# Patient Record
Sex: FEMALE | Race: WHITE
[De-identification: names, ages, dates, MRNs, and addresses within clinical notes are randomized per-mention and may not be internally consistent; named-entity substitution may affect disease eponyms.]

---

## 2019-08-11 ENCOUNTER — HOSPITAL ENCOUNTER (INPATIENT)
Dept: HOSPITAL 74 - JER | Age: 84
LOS: 3 days | Discharge: HOME | DRG: 552 | End: 2019-08-14
Attending: FAMILY MEDICINE | Admitting: FAMILY MEDICINE
Payer: COMMERCIAL

## 2019-08-11 VITALS — BODY MASS INDEX: 32.3 KG/M2

## 2019-08-11 DIAGNOSIS — Z88.0: ICD-10-CM

## 2019-08-11 DIAGNOSIS — K57.30: ICD-10-CM

## 2019-08-11 DIAGNOSIS — I50.42: ICD-10-CM

## 2019-08-11 DIAGNOSIS — E66.9: ICD-10-CM

## 2019-08-11 DIAGNOSIS — M19.90: ICD-10-CM

## 2019-08-11 DIAGNOSIS — K21.9: ICD-10-CM

## 2019-08-11 DIAGNOSIS — M54.9: Primary | ICD-10-CM

## 2019-08-11 DIAGNOSIS — I11.0: ICD-10-CM

## 2019-08-11 DIAGNOSIS — L84: ICD-10-CM

## 2019-08-11 DIAGNOSIS — M79.89: ICD-10-CM

## 2019-08-11 DIAGNOSIS — K29.70: ICD-10-CM

## 2019-08-11 DIAGNOSIS — D50.9: ICD-10-CM

## 2019-08-11 DIAGNOSIS — E78.5: ICD-10-CM

## 2019-08-11 DIAGNOSIS — D64.9: ICD-10-CM

## 2019-08-11 LAB
ALBUMIN SERPL-MCNC: 4 G/DL (ref 3.4–5)
ALP SERPL-CCNC: 77 U/L (ref 45–117)
ALT SERPL-CCNC: 16 U/L (ref 13–61)
ANION GAP SERPL CALC-SCNC: 5 MMOL/L (ref 8–16)
APPEARANCE UR: CLEAR
APTT BLD: 31.2 SECONDS (ref 25.2–36.5)
AST SERPL-CCNC: 19 U/L (ref 15–37)
BACTERIA # UR AUTO: 24.2 /HPF
BASOPHILS # BLD: 1.2 % (ref 0–2)
BILIRUB SERPL-MCNC: 0.3 MG/DL (ref 0.2–1)
BILIRUB UR STRIP.AUTO-MCNC: NEGATIVE MG/DL
BNP SERPL-MCNC: 655.9 PG/ML (ref 5–450)
BUN SERPL-MCNC: 18.3 MG/DL (ref 7–18)
CALCIUM SERPL-MCNC: 9.8 MG/DL (ref 8.5–10.1)
CASTS URNS QL MICRO: 0 /LPF (ref 0–8)
CHLORIDE SERPL-SCNC: 105 MMOL/L (ref 98–107)
CO2 SERPL-SCNC: 29 MMOL/L (ref 21–32)
COLOR UR: YELLOW
CREAT SERPL-MCNC: 0.9 MG/DL (ref 0.55–1.3)
DEPRECATED RDW RBC AUTO: 15.3 % (ref 11.6–15.6)
EOSINOPHIL # BLD: 1.3 % (ref 0–4.5)
EPITH CASTS URNS QL MICRO: 2.8 /HPF
GLUCOSE SERPL-MCNC: 101 MG/DL (ref 74–106)
HCT VFR BLD CALC: 28.5 % (ref 32.4–45.2)
HGB BLD-MCNC: 9.1 GM/DL (ref 10.7–15.3)
INR BLD: 0.98 (ref 0.83–1.09)
KETONES UR QL STRIP: NEGATIVE
LEUKOCYTE ESTERASE UR QL STRIP.AUTO: (no result)
LIPASE SERPL-CCNC: 93 U/L (ref 73–393)
LYMPHOCYTES # BLD: 35.2 % (ref 8–40)
MCH RBC QN AUTO: 26.8 PG (ref 25.7–33.7)
MCHC RBC AUTO-ENTMCNC: 32.1 G/DL (ref 32–36)
MCV RBC: 83.6 FL (ref 80–96)
MONOCYTES # BLD AUTO: 8.4 % (ref 3.8–10.2)
NEUTROPHILS # BLD: 53.9 % (ref 42.8–82.8)
NITRITE UR QL STRIP: NEGATIVE
PH UR: 6.5 [PH] (ref 5–8)
PLATELET # BLD AUTO: 182 K/MM3 (ref 134–434)
PMV BLD: 9.3 FL (ref 7.5–11.1)
POTASSIUM SERPLBLD-SCNC: 4.2 MMOL/L (ref 3.5–5.1)
PROT SERPL-MCNC: 8.1 G/DL (ref 6.4–8.2)
PROT UR QL STRIP: NEGATIVE
PROT UR QL STRIP: NEGATIVE
PT PNL PPP: 11.6 SEC (ref 9.7–13)
RBC # BLD AUTO: 1 /HPF (ref 0–4)
RBC # BLD AUTO: 3.41 M/MM3 (ref 3.6–5.2)
SODIUM SERPL-SCNC: 139 MMOL/L (ref 136–145)
SP GR UR: 1.01 (ref 1.01–1.03)
UROBILINOGEN UR STRIP-MCNC: 0.2 MG/DL (ref 0.2–1)
WBC # BLD AUTO: 6.2 K/MM3 (ref 4–10)
WBC # UR AUTO: 3 /HPF (ref 0–5)

## 2019-08-11 PROCEDURE — G0378 HOSPITAL OBSERVATION PER HR: HCPCS

## 2019-08-11 NOTE — PDOC
Documentation entered by Skyla Gallego SCRIBE, acting as scribe for Liang Pugh MD.








Liang Pugh MD:  This documentation has been prepared by the Julián delgado Sammi, SCRIBE, under my direction and personally reviewed by me in its 

entirety.  I confirm that the documentation accurately reflects all work, 

treatment, procedures, and medical decision making performed by me.  





Attending Attestation





- Resident


Resident Name: Woody Matamoros





- ED Attending Attestation


I have performed the following: I have examined & evaluated the patient, The 

case was reviewed & discussed with the resident, I agree w/resident's findings 

& plan, Exceptions are as noted





- HPI


HPI: 





08/11/19 22:58


86 F with h/o HTN, HLD, arthritis, presenting with sudden onset chest pain 

radiating to back. Pt states she was at rest when pain began. Denies exertional 

or pleuritic component to it. Pt also endorses BLE swelling. Denies h/o DVT/PE. 

Denies F/C/cough.





- Physicial Exam


PE: 





08/11/19 22:58


"GENERAL: Awake, alert, and fully oriented, in no acute distress.


HEAD: No signs of trauma


EYES: PERRLA, EOMI, sclera anicteric, conjunctiva clear


ENT: Auricles normal inspection, hearing grossly normal, nares patent, 

oropharynx clear without exudates. Moist mucosa


NECK: Nontender, no stepoffs, Normal ROM, supple, no lymphadenopathy, JVD, or 

masses


LUNGS: Breath sounds equal, clear to auscultation bilaterally.  No wheezes, and 

no crackles


HEART: Regular rate and rhythm, normal S1 and S2, no murmurs, rubs or gallops


ABDOMEN: Soft, nontender, normoactive bowel sounds.  No guarding, no rebound.  

No masses


EXTREMITIES: + BLE edema, R>L, No clubbing or cyanosis. No cords, erythema, or 

tenderness


NEUROLOGICAL: Cranial nerves II through XII intact. 5/5 strength and sensation 

in all extremities, Normal speech, normal gait, normal cerebellar function


SKIN: Warm, Dry, normal turgor, no rashes or lesions noted.








- Critical Care Time


Total Critical Care Time: 60


Critical Care Statement: The care of this patient involved high complexity 

decision making to prevent further life threatening deterioration of the patient

's condition and/or to evaluate & treat vital organ system(s) failure or risk 

of failure.





- Medical Decision Making





08/11/19 22:59


86 F with chest pain radiating to back. Found to be HTNsive in ED. Will r/o 

dissection. Pt also with asymmetric leg swelling, will need to evaluate for DVT/

PE. EKG with no ischemic changes to suggest ACS.


- Labs, trop, BNP


- CTA chest


- RLE Doppler


- BP control


- Admit tele

## 2019-08-11 NOTE — PDOC
History of Present Illness





- General


Chief Complaint: Chest Pain


Stated Complaint: CHEST PAIN


Time Seen by Provider: 08/11/19 19:38





- History of Present Illness


Initial Comments: 





08/11/19 21:49


86f with htn, arthritis and hld presenting with severe chest pain with 

radiation to the back between the shoulder blades that came suddenly while she 

was at home. 


Occasionally the pain makes it harder to breathe. 


Had similar symptoms in the past, had a negative cardiac workup: 


  - deconditioned stress MIBI 11/09: poor exercise tolerance--walked [id number] 

on [place]; no ischemia or injury)            


Stress Persantine MIBI 01/2012 (at [facility]): no myocardial ischemia; normal LVEF 

and wall motion by gated studies.





She denies  fever chills abdominal pain or dysuria. 


Denies active cancer, recent travel or hemoptysis. 


Did notice swelling of the right leg worse than the left, unsure when that 

appeared. 


08/11/19 22:44








Past History





- Past Medical History


Allergies/Adverse Reactions: 


 Allergies











Allergy/AdvReac Type Severity Reaction Status Date / Time


 


Penicillins Allergy Severe Difficulty Verified 10/22/16 00:52





   Breathing  











Home Medications: 


Ambulatory Orders





Amlodipine Besylate [Norvasc -] 2.5 mg PO DAILY 10/22/16 


Lansoprazole [Prevacid] 30 mg PO DAILY 10/22/16 


Lisinopril [Prinivil -] 40 mg PO DAILY 10/22/16 


Metoprolol Succinate [Toprol Xl] 50 mg PO DAILY 10/22/16 


Tramadol HCl 50 mg PO BID 10/22/16 








Cardiac Disorders: Yes


CHF: Yes (systolic and diastolic dysfuction)


GI Disorders: Yes (GASTRITIS. HERNIA.)


HTN: Yes





- Family Disease History


Family Disease History: Heart Disease: Mother (hypertension)





- Immunization History


Immunization Up to Date: Yes





- Suicide/Smoking/Psychosocial Hx


Smoking Status: No


Smoking History: Never smoked


Have you smoked in the past 12 months: No


Number of Cigarettes Smoked Daily: 0


Hx Alcohol Use: No


Drug/Substance Use Hx: No


Substance Use Type: None


Hx Substance Use Treatment: No





**Review of Systems





- Review of Systems


Able to Perform ROS?: Yes


Is the patient limited English proficient: No


Constitutional: No: Symptoms Reported


HEENTM: No: Symptoms Reported


Respiratory: Yes: See HPI


Cardiac (ROS): Yes: See HPI


ABD/GI: No: Symptoms Reported


: No: Symptoms Reported


Integumentary: No: Symptoms Reported


Neurological: No: Symptoms reported


All Other Systems: Reviewed and Negative





*Physical Exam





- Vital Signs


 Last Vital Signs











Temp Pulse Resp BP Pulse Ox


 


 98.7 F   59 L  19   182/69 H  98 


 


 08/11/19 19:05  08/11/19 19:05  08/11/19 19:05  08/11/19 19:05  08/11/19 19:05














- Physical Exam


General Appearance: Yes: Appropriately Dressed, Mild Distress, Obese


HEENT: positive: EOMI, ARIANNE, Normal ENT Inspection


Respiratory/Chest: positive: Lungs Clear, Normal Breath Sounds.  negative: 

Chest Tender, Respiratory Distress


Cardiovascular: positive: Regular Rhythm, S1, S2, Bradycardia


Gastrointestinal/Abdominal: positive: Normal Bowel Sounds, Soft, Protuberent.  

negative: Tender


Extremity: positive: Pedal Edema (Worse swelling over right leg, tender 

throughout the leg)


Integumentary: positive: Normal Color, Dry, Warm


Neurologic: positive: Fully Oriented, Alert, Normal Mood/Affect, Normal Response

, Motor Strength 5/5





**Heart Score/ECG Review





- History


History: Moderately suspicious





- Electrocardiogram


EKG: Normal





- Age


Age: >/= 65





- Risk Factors


Risk Factors Heart Score: Yes Hx Hypertension, Yes Positive family hx of 

cardiac disease, Yes Hx Obesity


Based on the list above the patient has:: >/=3 risk factors or Hx 

atherosclerotic disease





- Troponin


Troponin: </= normal limit





- Score


Heart Score - Total: 5





ED Treatment Course





- LABORATORY


CBC & Chemistry Diagram: 


 08/11/19 20:35





 08/11/19 20:35





Medical Decision Making





- Medical Decision Making





08/11/19 22:43


86F with severe tearing chest pain radiating to back, sob on/off and right leg 

swelling. 








Will obtain immediate CTA chest and abd/pelvis to r/o dissection.


In addition, suspicion for DVT/PE that will be excluded qwith the CTA chest and 

will get Duplex U/S of the right leg. 





Will obtain labs and cardiac trops as well to r/o MI. 





EKG: sinus bradycardia with marked sinus arrhytmia. Left axis deviation. Cannot 

r/o anterior infarct, age undertermines. Vent rate 59, , QRS 84  QT/QTC 

422/417








CTA read: 1. Study negative for thoracic/abdominal aortic dissection or 

aneurysm. No abnormalities of the


pelvic arterial vessels identified either.


2. No CT evidence for pulmonary embolism.


3. Colonic diverticulosis. No CT evidence of diverticulitis.


4. Degenerative disc disease and degenerative changes throughout the thoracic 

and lumbar spine








All labs wnl, except bnp elevated at 660





Will control htn with labetalol


1. Study negative for thoracic/abdominal aortic dissection or aneurysm. No 

abnormalities of the


pelvic arterial vessels identified either.


2. No CT evidence for pulmonary embolism.


3. Colonic diverticulosis. No CT evidence of diverticulitis.


4. Degenerative disc disease and degenerative changes throughout the thoracic 

and lumbar spine


08/11/19 22:52





DVT study Negative for DVT


08/11/19 23:57


Will control BP with labetalol 


Heart score of 5. Will admit to tele obs





*DC/Admit/Observation/Transfer


Diagnosis at time of Disposition: 


 Chest pain, CHF (congestive heart failure), Right leg swelling








- Discharge Dispostion


Decision to Admit order: Yes





- Referrals





- Patient Instructions





- Post Discharge Activity

## 2019-08-12 RX ADMIN — FUROSEMIDE SCH MG: 10 INJECTION, SOLUTION INTRAVENOUS at 15:16

## 2019-08-12 RX ADMIN — LISINOPRIL SCH MG: 20 TABLET ORAL at 11:02

## 2019-08-12 RX ADMIN — HEPARIN SODIUM SCH UNIT: 5000 INJECTION, SOLUTION INTRAVENOUS; SUBCUTANEOUS at 22:19

## 2019-08-12 RX ADMIN — HEPARIN SODIUM SCH UNIT: 5000 INJECTION, SOLUTION INTRAVENOUS; SUBCUTANEOUS at 11:00

## 2019-08-12 RX ADMIN — PANTOPRAZOLE SODIUM SCH MG: 40 TABLET, DELAYED RELEASE ORAL at 11:02

## 2019-08-12 RX ADMIN — AMLODIPINE BESYLATE SCH MG: 2.5 TABLET ORAL at 11:02

## 2019-08-12 NOTE — CON.CARD
Consult


Consult Specialty:: Cardiology





- History of Present Illness


History of Present Illness: 





86 year old female with PMHx of HTN/HLD, Arthritis, arrived to ED for complain 

of upper back pain (around right shoulder blade) with occasional sob, pain 

present for 2 days as per patient took Tylenol with relief,  also complains of B

/L LE swelling. Denies headache dizziness, fever chills abdominal pain or 

dysuria. 





- History Source


History Provided By: Patient, Medical Record





- Past Medical History


Cardio/Vascular: Yes: HTN





- Alcohol/Substance Use


Hx Alcohol Use: No





- Smoking History


Smoking history: Never smoked


Have you smoked in the past 12 months: No


Aproximately how many cigarettes per day: 0





Home Medications





- Allergies


Allergies/Adverse Reactions: 


 Allergies











Allergy/AdvReac Type Severity Reaction Status Date / Time


 


Penicillins Allergy Severe Difficulty Verified 10/22/16 00:52





   Breathing  














- Home Medications


Home Medications: 


Ambulatory Orders





Amlodipine Besylate [Norvasc -] 2.5 mg PO DAILY 10/22/16 


Lansoprazole [Prevacid] 30 mg PO DAILY 10/22/16 


Lisinopril [Prinivil -] 40 mg PO DAILY 10/22/16 


Metoprolol Succinate [Toprol Xl] 50 mg PO DAILY 10/22/16 


Tramadol HCl 50 mg PO BID 10/22/16 











Review of Systems





- Review of Systems


Constitutional: reports: No Symptoms


Eyes: reports: No Symptoms


HENT: reports: No Symptoms


Neck: reports: No Symptoms


Cardiovascular: reports: Edema, Shortness of Breath


Respiratory: reports: SOB


Gastrointestinal: reports: No Symptoms


Genitourinary: reports: No Symptoms


Breasts: reports: No Symptoms Reported


Musculoskeletal: reports: No Symptoms


Integumentary: reports: No Symptoms


Neurological: reports: No Symptoms


Endocrine: reports: No Symptoms


Hematology/Lymphatic: reports: No Symptoms


Psychiatric: reports: No Symptoms


Vital Signs: 


 Vital Signs











Temperature  98 F   08/12/19 09:00


 


Pulse Rate  60   08/12/19 09:00


 


Respiratory Rate  18   08/12/19 09:00


 


Blood Pressure  162/69   08/12/19 09:00


 


O2 Sat by Pulse Oximetry (%)  100   08/12/19 09:00











Constitutional: Yes: Well Nourished, No Distress, Calm


Eyes: Yes: WNL, Conjunctiva Clear, EOM Intact


HENT: Yes: WNL, Atraumatic, Normocephalic


Neck: Yes: WNL, Supple, Trachea Midline


Respiratory: Yes: WNL, Regular, CTA Bilaterally


Gastrointestinal: Yes: WNL, Normal Bowel Sounds


Renal/: Yes: WNL


Cardiovascular: Yes: WNL, Regular Rate and Rhythm


Musculoskeletal: Yes: WNL


Extremities: Yes: WNL


Edema: Yes


Edema: LLE: Trace, RLE: Trace


Integumentary: Yes: WNL


Neurological: Yes: WNL, Alert, Oriented


...Motor Strength: WNL


Psychiatric: Yes: WNL, Alert, Oriented





- Other Data


Labs, Other Data: 


 CBC, BMP





 08/11/19 20:35 





 08/11/19 20:35 





 INR, PTT











INR  0.98  (0.83-1.09)   08/11/19  20:35    








 Troponin, BNP











  08/11/19 08/11/19





  20:35 20:35


 


Troponin I  < 0.02 


 


B-Natriuretic Peptide   655.9 H








 Troponin, BNP











  08/11/19 08/11/19





  20:35 20:35


 


Troponin I  < 0.02 


 


B-Natriuretic Peptide   655.9 H














Imaging





- Results


Chest X-ray: Pending


EKG: Image Reviewed (s beverly s arhythmia)





Problem List





- Problems


(1) Arthritis


Code(s): M19.90 - UNSPECIFIED OSTEOARTHRITIS, UNSPECIFIED SITE   





(2) CHF (congestive heart failure)


Code(s): I50.9 - HEART FAILURE, UNSPECIFIED   





(3) Chest pain


Code(s): R07.9 - CHEST PAIN, UNSPECIFIED   





(4) GERD (gastroesophageal reflux disease)


Code(s): K21.9 - GASTRO-ESOPHAGEAL REFLUX DISEASE WITHOUT ESOPHAGITIS   





(5) HLD (hyperlipidemia)


Code(s): E78.5 - HYPERLIPIDEMIA, UNSPECIFIED   





(6) Right leg swelling


Code(s): M79.89 - OTHER SPECIFIED SOFT TISSUE DISORDERS   





(7) DVT prophylaxis


Code(s): DFL9982 -    





(8) Gastritis


Code(s): K29.70 - GASTRITIS, UNSPECIFIED, WITHOUT BLEEDING   





(9) Callus of foot


Code(s): L84 - CORNS AND CALLOSITIES   





(10) HTN (hypertension), benign


Code(s): I10 - ESSENTIAL (PRIMARY) HYPERTENSION   





Assessment/Plan





HTN/HLD, Arthritis, arrived to ED for complain of upper back pain (around right 

shoulder blade) with occasional sob,


chf


anemia


cta neg for pe dissection





Plan


r/o mi


echo


iv lasix


stress test if not done recently in the office

## 2019-08-12 NOTE — HP
CHIEF COMPLAINT: Upper back pain with sob 





PCP: Dr. Vargas 





HISTORY OF PRESENT ILLNESS: 86 year old female with PMHx of HTN/HLD, Arthritis, 

arrived to ED for complain of upper back pain (around right shoulder blade) 

with occasional sob, pain present for 2 days as per patient took Tylenol with 

relief,  also complains of B/L LE swelling. Denies headache dizziness, fever 

chills abdominal pain or dysuria. 





ER course was notable for:


- deconditioned stress MIBI 11/09: poor exercise tolerance--walked [id number] on 

[place]; no ischemia or injury)            


- Stress Persantine MIBI 01/2012 (at [facility]): no myocardial ischemia; normal LVEF 

and wall motion by gated studies.


- trops, EKG negative, CTA and vascular US negative 





Recent Travel: No





PAST MEDICAL HISTORY: HTN/HLD, Arthritis





PAST SURGICAL HISTORY: denies 





Social History:


Smoking:No


Alcohol: No


Drugs: No





Family History: Mother: had HTN , Daughter:  has HTN 


Allergies: Penicillins Allergy (Severe, Verified 10/22/16 00:52)


 Difficulty Breathing








HOME MEDICATIONS:


 Home Medications











 Medication  Instructions  Recorded


 


Amlodipine Besylate [Norvasc -] 2.5 mg PO DAILY 10/22/16


 


Lansoprazole [Prevacid] 30 mg PO DAILY 10/22/16


 


Lisinopril [Prinivil -] 40 mg PO DAILY 10/22/16


 


Metoprolol Succinate [Toprol Xl] 50 mg PO DAILY 10/22/16


 


Tramadol HCl 50 mg PO BID 10/22/16








REVIEW OF SYSTEMS


CONSTITUTIONAL: Absent:  fever, chills, diaphoresis, generalized weakness, 

malaise, loss of appetite, weight change


HEENT: Absent:  rhinorrhea, nasal congestion, throat pain, throat swelling, 

difficulty swallowing, mouth swelling, ear pain, eye pain, visual changes


CARDIOVASCULAR: Absent: chest pain, syncope, palpitations, irregular heart rate

, lightheadedness, peripheral edema


RESPIRATORY: + shortness of breath;  Absent: cough, dyspnea with exertion, 

orthopnea, wheezing, stridor, hemoptysis


GASTROINTESTINAL: Absent: abdominal pain, abdominal distension, nausea, vomiting

, diarrhea, constipation, melena, hematochezia


GENITOURINARY:  Absent: dysuria, frequency, urgency, hesitancy, hematuria, 

flank pain, genital pain


MUSCULOSKELETAL: + upper back back between right shoulder blade 


SKIN: Absent: rash, itching, pallor


NEUROLOGIC: Absent: headache, focal weakness or paresthesias, dizziness, 

unsteady gait, seizure, mental status changes, bladder or bowel incontinence


PSYCHIATRIC: Absent: anxiety, depression, suicidal or homicidal ideation, 

hallucinations.








PHYSICAL EXAMINATION


 Vital Signs - 24 hr











  08/11/19 08/11/19 08/11/19





  19:05 22:00 22:30


 


Temperature 98.7 F  


 


Pulse Rate 59 L  


 


Pulse Rate [  72 





Left Radial]   


 


Respiratory 19 20 





Rate   


 


Blood Pressure 182/69 H  


 


Blood Pressure  198/95 H 





[Left Arm]   


 


Blood Pressure  180/70 H 





[Right Arm]   


 


O2 Sat by Pulse 98 96 100





Oximetry (%)   














  08/12/19





  00:48


 


Temperature 


 


Pulse Rate 


 


Pulse Rate [ 60





Left Radial] 


 


Respiratory 16





Rate 


 


Blood Pressure 


 


Blood Pressure 172/77 H





[Left Arm] 


 


Blood Pressure 197/71 H





[Right Arm] 


 


O2 Sat by Pulse 97





Oximetry (%) 











GENERAL: Awake, alert, and fully oriented, in no acute distress.


HEENT: NC/AT, EOMI, PERRLA, No JVD


LUNGS: Breath sounds equal, clear to auscultation bilaterally. No wheezes, and 

no crackles.


HEART: Regular rate and rhythm, normal S1 and S2 without murmur, rub or gallop.


ABDOMEN: Soft, nontender, not distended, normoactive bowel sounds, no guarding, 

no rebound, no masses.  


MUSCULOSKELETAL: Normal range of motion at all joints. No bony deformities or 

tenderness. No CVA tenderness.


Extremity: + 2 B/l LE 


NEUROLOGICAL:  Cranial nerves II-XII intact. Normal speech. Normal gait.


PSYCHIATRIC: Cooperative. Good eye contact. Appropriate mood and affect.


SKIN: Warm, dry


 Laboratory Results - last 24 hr











  08/11/19 08/11/19 08/11/19





  20:35 20:35 20:35


 


WBC    6.2


 


RBC    3.41 L


 


Hgb    9.1 L


 


Hct    28.5 L


 


MCV    83.6


 


MCH    26.8  D


 


MCHC    32.1


 


RDW    15.3


 


Plt Count    182


 


MPV    9.3


 


Absolute Neuts (auto)    3.3


 


Neutrophils %    53.9  D


 


Lymphocytes %    35.2


 


Monocytes %    8.4


 


Eosinophils %    1.3


 


Basophils %    1.2


 


Nucleated RBC %    0


 


PT with INR  11.60  


 


INR  0.98  


 


PTT (Actin FS)  31.2  


 


Sodium   


 


Potassium   


 


Chloride   


 


Carbon Dioxide   


 


Anion Gap   


 


BUN   


 


Creatinine   


 


Est GFR (CKD-EPI)AfAm   


 


Est GFR (CKD-EPI)NonAf   


 


Random Glucose   


 


Lactic Acid   


 


Calcium   


 


Total Bilirubin   


 


AST   


 


ALT   


 


Alkaline Phosphatase   


 


Creatine Kinase   152 


 


Creatine Kinase Index   1.2 


 


CK-MB (CK-2)   1.9 


 


Troponin I   < 0.02 


 


B-Natriuretic Peptide   


 


Total Protein   


 


Albumin   


 


Lipase   


 


Urine Color   


 


Urine Appearance   


 


Urine pH   


 


Ur Specific Gravity   


 


Urine Protein   


 


Urine Glucose (UA)   


 


Urine Ketones   


 


Urine Blood   


 


Urine Nitrite   


 


Urine Bilirubin   


 


Urine Urobilinogen   


 


Ur Leukocyte Esterase   


 


Urine WBC (Auto)   


 


Urine RBC (Auto)   


 


Urine Casts (Auto)   


 


U Epithel Cells (Auto)   


 


Urine Bacteria (Auto)   


 


Blood Type   


 


Antibody Screen   














  08/11/19 08/11/19 08/11/19





  20:35 20:35 21:00


 


WBC   


 


RBC   


 


Hgb   


 


Hct   


 


MCV   


 


MCH   


 


MCHC   


 


RDW   


 


Plt Count   


 


MPV   


 


Absolute Neuts (auto)   


 


Neutrophils %   


 


Lymphocytes %   


 


Monocytes %   


 


Eosinophils %   


 


Basophils %   


 


Nucleated RBC %   


 


PT with INR   Cancelled 


 


INR   Cancelled 


 


PTT (Actin FS)   


 


Sodium  139  


 


Potassium  4.2  


 


Chloride  105  


 


Carbon Dioxide  29  


 


Anion Gap  5 L  


 


BUN  18.3 H  


 


Creatinine  0.9  


 


Est GFR (CKD-EPI)AfAm  67.10  


 


Est GFR (CKD-EPI)NonAf  57.90  


 


Random Glucose  101  


 


Lactic Acid    1.2


 


Calcium  9.8  


 


Total Bilirubin  0.3  


 


AST  19  


 


ALT  16  


 


Alkaline Phosphatase  77  


 


Creatine Kinase   


 


Creatine Kinase Index   


 


CK-MB (CK-2)   


 


Troponin I   


 


B-Natriuretic Peptide  655.9 H  


 


Total Protein  8.1  


 


Albumin  4.0  


 


Lipase  93  


 


Urine Color   


 


Urine Appearance   


 


Urine pH   


 


Ur Specific Gravity   


 


Urine Protein   


 


Urine Glucose (UA)   


 


Urine Ketones   


 


Urine Blood   


 


Urine Nitrite   


 


Urine Bilirubin   


 


Urine Urobilinogen   


 


Ur Leukocyte Esterase   


 


Urine WBC (Auto)   


 


Urine RBC (Auto)   


 


Urine Casts (Auto)   


 


U Epithel Cells (Auto)   


 


Urine Bacteria (Auto)   


 


Blood Type   


 


Antibody Screen   














  08/11/19 08/11/19 08/11/19





  21:00 21:20 21:20


 


WBC   


 


RBC   


 


Hgb   


 


Hct   


 


MCV   


 


MCH   


 


MCHC   


 


RDW   


 


Plt Count   


 


MPV   


 


Absolute Neuts (auto)   


 


Neutrophils %   


 


Lymphocytes %   


 


Monocytes %   


 


Eosinophils %   


 


Basophils %   


 


Nucleated RBC %   


 


PT with INR   


 


INR   


 


PTT (Actin FS)   


 


Sodium   


 


Potassium   


 


Chloride   


 


Carbon Dioxide   


 


Anion Gap   


 


BUN   


 


Creatinine   


 


Est GFR (CKD-EPI)AfAm   


 


Est GFR (CKD-EPI)NonAf   


 


Random Glucose   


 


Lactic Acid   


 


Calcium   


 


Total Bilirubin   


 


AST   


 


ALT   


 


Alkaline Phosphatase   


 


Creatine Kinase   


 


Creatine Kinase Index   


 


CK-MB (CK-2)   


 


Troponin I   


 


B-Natriuretic Peptide   


 


Total Protein   


 


Albumin   


 


Lipase   


 


Urine Color    Yellow


 


Urine Appearance    Clear


 


Urine pH    6.5


 


Ur Specific Gravity    1.006 L


 


Urine Protein    Negative


 


Urine Glucose (UA)    Negative


 


Urine Ketones    Negative


 


Urine Blood    Negative


 


Urine Nitrite    Negative


 


Urine Bilirubin    Negative


 


Urine Urobilinogen    0.2


 


Ur Leukocyte Esterase    2+ H


 


Urine WBC (Auto)    3


 


Urine RBC (Auto)    1


 


Urine Casts (Auto)    0


 


U Epithel Cells (Auto)    2.8


 


Urine Bacteria (Auto)    24.2


 


Blood Type  O POSITIVE  Cancelled 


 


Antibody Screen  Negative  Cancelled 











ASSESSMENT/PLAN:





86 year old female with PMHx of HTN/HLD, Arthritis, arrived to ED with complain 

of upper back pain (around right shoulder blade) with occasional sob, pain 

present for 2 days as per patient takes Tylenol with relief,  also complains of 

B/L LE swelling. In ED noted with elevated BP left arm ( 172/77) right arm ( 197

/71) given labetalol and lasix x1 





# CP with SOB 


# HTN


-EKG: sinus bradycardia with marked sinus arrhytmia


-trop: negative, BNP: unimpressive 


-CTA chest: negative for PE


-RLE Doppler: negative for DVT 


 In ED given Labetalol 20 mg, and lasix 40 mg IV push x1 


- Continue with Norvasc 2.5 mg po daily


- Continue with Prinivil 40 mg po daily 


- Continue with Metoprolol 50 mg po daily 


- cardiology follow up 





#GERD


- continue with Prevacid  30 mg daily 





# Arthritis 


- Continue with Tramadol HCL 50 mg BID 











Problem List





- Problem


(1) Chest pain


Code(s): R07.9 - CHEST PAIN, UNSPECIFIED   





(2) HTN (hypertension), benign


Code(s): I10 - ESSENTIAL (PRIMARY) HYPERTENSION   





(3) HLD (hyperlipidemia)


Code(s): E78.5 - HYPERLIPIDEMIA, UNSPECIFIED   





(4) Arthritis


Code(s): M19.90 - UNSPECIFIED OSTEOARTHRITIS, UNSPECIFIED SITE   





(5) GERD (gastroesophageal reflux disease)


Code(s): K21.9 - GASTRO-ESOPHAGEAL REFLUX DISEASE WITHOUT ESOPHAGITIS   





Visit type





- Emergency Visit


Emergency Visit: Yes


ED Registration Date: 08/11/19


Care time: The patient presented to the Emergency Department on the above date 

and was hospitalized for further evaluation of their emergent condition.





- New Patient


This patient is new to me today: Yes


Date on this admission: 08/12/19





- Critical Care


Critical Care patient: No

## 2019-08-12 NOTE — EKG
Test Reason : 

Blood Pressure : ***/*** mmHG

Vent. Rate : 059 BPM     Atrial Rate : 059 BPM

   P-R Int : 182 ms          QRS Dur : 084 ms

    QT Int : 422 ms       P-R-T Axes : 051 -34 030 degrees

   QTc Int : 417 ms

 

SINUS BRADYCARDIA WITH MARKED SINUS ARRHYTHMIA

LEFT AXIS DEVIATION

WHEN COMPARED WITH ECG OF 22-OCT-2016 04:17,

NO SIGNIFICANT CHANGE WAS FOUND

Confirmed by ALLIE MENDES MD ([id number]) on 8/12/2019 11:09:28 AM

 

Referred By:             Confirmed By:ALLIE MENDES MD

## 2019-08-12 NOTE — PN
Progress Note, Physician


Chief Complaint: 





came in for chest discomfort





- Current Medication List


Current Medications: 


Active Medications





Amlodipine Besylate (Norvasc -)  2.5 mg PO DAILY [facility]


   Last Admin: 08/12/19 11:02 Dose:  2.5 mg


Furosemide (Lasix Injection -)  40 mg IVPUSH DAILY [facility]


Heparin Sodium (Porcine) (Heparin -)  5,000 unit SQ BID [facility]


   Last Admin: 08/12/19 11:00 Dose:  5,000 unit


Lisinopril (Prinivil)  40 mg PO DAILY [facility]


   Last Admin: 08/12/19 11:02 Dose:  40 mg


Metoprolol Succinate (Toprol Xl -)  50 mg PO DAILY [facility]


   Last Admin: 08/12/19 11:02 Dose:  50 mg


Pantoprazole Sodium (Protonix -)  40 mg PO DAILY [facility]


   Last Admin: 08/12/19 11:02 Dose:  40 mg


Tramadol HCl (Ultram -)  50 mg PO BID [facility]


   Last Admin: 08/12/19 11:08 Dose:  50 mg











- Objective


Vital Signs: 


 Vital Signs











Temperature  97.8 F   08/12/19 11:00


 


Pulse Rate  73   08/12/19 11:00


 


Respiratory Rate  16   08/12/19 11:00


 


Blood Pressure  154/65   08/12/19 11:00


 


O2 Sat by Pulse Oximetry (%)  100   08/12/19 11:00











Constitutional: Yes: Calm


Cardiovascular: Yes: Regular Rate and Rhythm, S1, S2


Respiratory: Yes: CTA Bilaterally


Gastrointestinal: Yes: Normal Bowel Sounds, Soft


Neurological: Yes: Alert


Labs: 


 CBC, BMP





 08/11/19 20:35 





 08/11/19 20:35 





 INR, PTT











INR  0.98  (0.83-1.09)   08/11/19  20:35    














Problem List





- Problems


(1) CHF (congestive heart failure)


Assessment/Plan: 


iv lasix


echo


Code(s): I50.9 - HEART FAILURE, UNSPECIFIED   





(2) Chest pain


Assessment/Plan: 


trend troponin


echo


may get stress test


lipid panel


Code(s): R07.9 - CHEST PAIN, UNSPECIFIED

## 2019-08-12 NOTE — ECHO
______________________________________________________________________________



Name: ALVINOFLAKO                                      Exam:Adult Echocardiogram

MRN: [id number]         Study Date: 2019 02:25 PM

Age: 86 yrs

______________________________________________________________________________



Reason For Study: ef/chf

Height: 62 in        Weight: 180 lb        BSA: 1.8 m2



______________________________________________________________________________



MMode/2D Measurements & Calculations

IVSd: 0.87 cm                                    Ao root diam: 3.0 cm

LVIDd: 4.1 cm                                    LA dimension: 3.4 cm

LVIDs: 2.6 cm

LVPWd: 1.2 cm



__________________________________________________

LVPWs: 1.2 cm                                    EDV(Teich): 74.8 ml

                                                 ESV(Teich): 24.8 ml



__________________________________________________

LVOT diam: 1.9 cm                                RV S Leandro: 13.6 cm/sec



Doppler Measurements & Calculations

MV E max leandro: 81.0 cm/sec                                 Ao V2 max: 194.1 cm/sec

MV A max leandro: 112.5 cm/sec                                Ao max PG: 15.1 mmHg

MV E/A: 0.72                                              Ao V2 mean: 126.0 cm/sec

MV dec time: 0.27 sec                                     Ao mean P.4 mmHg

                                                          Ao V2 VTI: 37.6 cm



                                                          DAMIEN(I,D): 2.3 cm2

                                                          DAMIEN(V,D): 1.9 cm2

___________________________________________________________



LV V1 max P.5 mmHg                                    SV(LVOT): 86.1 ml

LV V1 mean PG: 3.9 mmHg

LV V1 max: 136.8 cm/sec

LV V1 mean: 91.1 cm/sec

LV V1 VTI: 31.4 cm

___________________________________________________________



TR max leandro: 267.0 cm/sec                                  PA V2 max: 95.0 cm/sec

TR max P.5 mmHg                                      PA max PG: 3.6 mmHg

___________________________________________________________



Med Peak E' Leandro: 4.7 cm/sec

Med E/e': 17.3

Lat Peak E' Leandro: 6.5 cm/sec

Lat E/e': 12.4



______________________________________________________________________________



Procedure

A complete two-dimensional transthoracic echocardiogram was performed (2D, M-mode, Doppler and color 
flow

Doppler).

Left Ventricle

The left ventricle is normal in size. Left ventricular systolic function is normal. Ejection Fraction
 = 65-

70%. Diastolic dysfunction, Grade II, consistent with elevated left atrial pressure. Ratio E/E'= 17. 
No

regional wall motion abnormalities noted.

Right Ventricle

The right ventricle is normal size. The right ventricular systolic function is normal. RV systolic TD
I is 14

cm/s.

Atria

The left atrial size is normal. Right atrial size is normal.

Mitral Valve

The mitral valve is normal in structure and function. There is mild mitral regurgitation.

Tricuspid Valve

The tricuspid valve is normal in structure and function. There is mild to moderate tricuspid regurgit
ation.

Pulmonary artery systolic pressure is at least 33 mmHg if RA pressure is assumed 3 mmHg.

Aortic Valve

The aortic valve is normal in structure and function. No aortic regurgitation is present.

Pulmonic Valve

The pulmonic valve is not well visualized.

Great Vessels

The aortic root is normal size.

Pericardium/Pleura

There is no pericardial effusion.

______________________________________________________________________________





Interpretation Summary

The left ventricle is normal in size.

Left ventricular systolic function is normal.

No regional wall motion abnormalities noted.

Ejection Fraction = 65-70%.

Diastolic dysfunction, Grade II, consistent with elevated left atrial pressure.

Ratio E/E'= 17

The right ventricular systolic function is normal.

The left atrial size is normal.

Right atrial size is normal.

There is mild mitral regurgitation.

There is mild to moderate tricuspid regurgitation.

Pulmonary artery systolic pressure is at least 33 mmHg if RA pressure is assumed 3 mmHg

There is no pericardial effusion.



Previous study is not available for comparison





Speedy Pool MD 2019 03:27 PM

## 2019-08-13 LAB
ALBUMIN SERPL-MCNC: 3.4 G/DL (ref 3.4–5)
ALP SERPL-CCNC: 70 U/L (ref 45–117)
ALT SERPL-CCNC: 15 U/L (ref 13–61)
ANION GAP SERPL CALC-SCNC: 7 MMOL/L (ref 8–16)
AST SERPL-CCNC: 16 U/L (ref 15–37)
BASOPHILS # BLD: 1 % (ref 0–2)
BILIRUB SERPL-MCNC: 0.4 MG/DL (ref 0.2–1)
BUN SERPL-MCNC: 23.6 MG/DL (ref 7–18)
CALCIUM SERPL-MCNC: 8.9 MG/DL (ref 8.5–10.1)
CHLORIDE SERPL-SCNC: 105 MMOL/L (ref 98–107)
CHOLEST SERPL-MCNC: 180 MG/DL (ref 50–200)
CO2 SERPL-SCNC: 28 MMOL/L (ref 21–32)
CREAT SERPL-MCNC: 1.1 MG/DL (ref 0.55–1.3)
DEPRECATED RDW RBC AUTO: 15.5 % (ref 11.6–15.6)
EOSINOPHIL # BLD: 5 % (ref 0–4.5)
GLUCOSE SERPL-MCNC: 100 MG/DL (ref 74–106)
HCT VFR BLD CALC: 26.5 % (ref 32.4–45.2)
HDLC SERPL-MCNC: 82 MG/DL (ref 40–60)
HGB BLD-MCNC: 8.6 GM/DL (ref 10.7–15.3)
IRON SERPL-MCNC: 24 UG/DL (ref 50–175)
LYMPHOCYTES # BLD: 36.7 % (ref 8–40)
MCH RBC QN AUTO: 27 PG (ref 25.7–33.7)
MCHC RBC AUTO-ENTMCNC: 32.5 G/DL (ref 32–36)
MCV RBC: 83 FL (ref 80–96)
MONOCYTES # BLD AUTO: 14.7 % (ref 3.8–10.2)
NEUTROPHILS # BLD: 42.6 % (ref 42.8–82.8)
PLATELET # BLD AUTO: 168 K/MM3 (ref 134–434)
PMV BLD: 9.4 FL (ref 7.5–11.1)
POTASSIUM SERPLBLD-SCNC: 3.9 MMOL/L (ref 3.5–5.1)
PROT SERPL-MCNC: 7.2 G/DL (ref 6.4–8.2)
RBC # BLD AUTO: 3.19 M/MM3 (ref 3.6–5.2)
SODIUM SERPL-SCNC: 140 MMOL/L (ref 136–145)
TIBC SERPL-MCNC: 386 UG/DL (ref 250–450)
TRIGL SERPL-MCNC: 66 MG/DL (ref 0–150)
WBC # BLD AUTO: 4.8 K/MM3 (ref 4–10)

## 2019-08-13 RX ADMIN — HEPARIN SODIUM SCH UNIT: 5000 INJECTION, SOLUTION INTRAVENOUS; SUBCUTANEOUS at 21:46

## 2019-08-13 RX ADMIN — LIDOCAINE SCH PATCH: 50 PATCH TOPICAL at 10:00

## 2019-08-13 RX ADMIN — FUROSEMIDE SCH MG: 10 INJECTION, SOLUTION INTRAVENOUS at 09:13

## 2019-08-13 RX ADMIN — LISINOPRIL SCH MG: 20 TABLET ORAL at 09:13

## 2019-08-13 RX ADMIN — PANTOPRAZOLE SODIUM SCH MG: 40 TABLET, DELAYED RELEASE ORAL at 09:12

## 2019-08-13 RX ADMIN — AMLODIPINE BESYLATE SCH MG: 2.5 TABLET ORAL at 09:12

## 2019-08-13 RX ADMIN — HEPARIN SODIUM SCH UNIT: 5000 INJECTION, SOLUTION INTRAVENOUS; SUBCUTANEOUS at 09:14

## 2019-08-13 NOTE — CONSULT
Consult


Consult Specialty:: Hematology oncology


Referred by:: Dr. Coughlin


Reason for Consultation:: Anemia





- History Source


History Provided By: Patient, Family Member, Medical Record





- Past Medical History


Cardio/Vascular: Yes: CHF, HTN, Hyperlipdemia


Pulmonary: Yes: COPD


...Pregnant: No


Heme/Onc: Yes: Anemia


Musculoskeletal: Yes: Chronic low back pain, Osteoarthritis





- Past Surgical History


Past Surgical History: Yes: None





- Alcohol/Substance Use


Hx Alcohol Use: No





- Smoking History


Smoking history: Never smoked


Have you smoked in the past 12 months: No


Aproximately how many cigarettes per day: 0





Home Medications





- Allergies


Allergies/Adverse Reactions: 


 Allergies











Allergy/AdvReac Type Severity Reaction Status Date / Time


 


Penicillins Allergy Severe Difficulty Verified 10/22/16 00:52





   Breathing  














- Home Medications


Home Medications: 


Ambulatory Orders





Amlodipine Besylate [Norvasc -] 2.5 mg PO DAILY 10/22/16 


Lansoprazole [Prevacid] 30 mg PO DAILY 10/22/16 


Lisinopril [Prinivil -] 40 mg PO DAILY 10/22/16 


Metoprolol Succinate [Toprol Xl] 50 mg PO DAILY 10/22/16 


Tramadol HCl 50 mg PO BID 10/22/16 











Review of Systems





- Review of Systems


Constitutional: denies: Fever, Lethargy, Loss of Appetite, Night Sweats, 

Unintentional Wgt. Loss, Weakness


Eyes: denies: Double Vision, Eye Pain


HENT: denies: Difficult Swallowing, Epistaxis, Ringing in Ears


Neck: denies: Stiffness, Swollen Glands


Cardiovascular: reports: Shortness of Breath.  denies: Chest Pain


Respiratory: reports: SOB on Exertion


Gastrointestinal: reports: Other (occasional reflux)


Genitourinary: denies: Burning, Dysuria


Breasts: reports: Other (mammography- negativein past)


Musculoskeletal: reports: Back Pain, Decreased ROM, Extremity Pain


Integumentary: denies: Eczema, Erythema


Neurological: denies: Change in LOC, Dizziness, Numbness


Endocrine: reports: No Symptoms.  denies: Unexplained Weight Loss


Hematology/Lymphatic: denies: Easily Bruised, Swollen Glands





Physical Exam


Vital Signs: 


 Vital Signs











Temperature  97.8 F   08/13/19 14:00


 


Pulse Rate  92 H  08/13/19 14:00


 


Respiratory Rate  20   08/13/19 14:00


 


Blood Pressure  170/54 L  08/13/19 14:00


 


O2 Sat by Pulse Oximetry (%)  97   08/13/19 09:00











Constitutional: Yes: No Distress


Eyes: Yes: PERRL.  No: Diplopia, Ptosis, Sclera Icterus, Other


HENT: Yes: Normocephalic, Other (upper and lower dentures).  No: Pharyngeal 

Erythema, Tonsillar Exudate


Neck: Yes: Supple.  No: Lymphadenopathy, Thyromegaly


Cardiovascular: Yes: Regular Rate and Rhythm, Murmur


Respiratory: Yes: Other (poor inspiratory effort)


Gastrointestinal: No: Ascites, Distention, Hepatomegaly, Splenomegaly


Renal/: No: CVA Tenderness - Left, CVA Tenderness - Right


Breast(s): Yes: WNL, Left, Right


Musculoskeletal: Yes: Back Pain


Extremities: No: Calf Tenderness, Cool, Cyanosis


Edema: No


Integumentary: No: Bruising, Erythema, Jaundice


Neurological: Yes: WNL


...Motor Strength: WNL


Psychiatric: Yes: WNL


Labs: 


 CBC, BMP





 08/13/19 05:55 





 08/13/19 05:55 











Imaging





- Results


Cat Scan: Report Reviewed





Problem List





- Problems


(1) Anemia


Assessment/Plan: 


Iron studies with Fe++ 24;  TIBC-- 386 ;   % saturation -- 6%


Ferritin - 7,7 


Picture compatible with Fe++ deficiency 


Followed by Dr. Peralta


Intolerant to oral iron - has received iron infusion  therapy in past. 


States has had negative upper and lower endoscopy in past . 


Of interest - indices normal raising possibility of additional component to 

anemia in addition to iron deficiency  but will order HbE, celiac screen.  





Code(s): D64.9 - ANEMIA, UNSPECIFIED   


Qualifiers: 


   Anemia type: iron deficiency 





(2) Back pain


Code(s): M54.9 - DORSALGIA, UNSPECIFIED   





(3) CHF (congestive heart failure)


Code(s): I50.9 - HEART FAILURE, UNSPECIFIED

## 2019-08-13 NOTE — PN
Progress Note, Physician


Chief Complaint: 





PT OOB in chair; home aide is at her side. Pt has no back pain since receiving 

medication.


History of Present Illness: 





86 woman  with h/o diastolic CHF, HTN, HLD, arthritis, overweight, anxiety, now 

presenting with sudden onset stabbing back pain (central, interscapular; lasts 

a few seconds) .. Pt states she was at rest when pain began. She has had 

similar episodes a few times over the past several weeks. Denies chest pain.  

Denies exertional or pleuritic component to it. Pt also endorses BLE swelling. 

Denies h/o DVT/PE. Denies F/C/cough.








- Current Medication List


Current Medications: 


Active Medications





Amlodipine Besylate (Norvasc -)  2.5 mg PO DAILY [facility]


   Last Admin: 08/13/19 09:12 Dose:  2.5 mg


Furosemide (Lasix Injection -)  40 mg IVPUSH DAILY [facility]


   Last Admin: 08/13/19 09:13 Dose:  40 mg


Heparin Sodium (Porcine) (Heparin -)  5,000 unit SQ BID [facility]


   Last Admin: 08/13/19 09:14 Dose:  5,000 unit


Lidocaine (Lidoderm Patch -)  1 patch TP DAILY [facility]


Lisinopril (Prinivil)  40 mg PO DAILY [facility]


   Last Admin: 08/13/19 09:13 Dose:  40 mg


Metoprolol Succinate (Toprol Xl -)  50 mg PO DAILY [facility]


   Last Admin: 08/13/19 09:12 Dose:  50 mg


Miscellaneous (Lidoderm Patch Removal)  1 each MC DAILY@2200 [facility]


Pantoprazole Sodium (Protonix -)  40 mg PO DAILY [facility]


   Last Admin: 08/13/19 09:12 Dose:  40 mg


Tramadol HCl (Ultram -)  50 mg PO BID [facility]


   Last Admin: 08/13/19 09:12 Dose:  50 mg











- Objective


Vital Signs: 


 Vital Signs











Temperature  97.9 F   08/13/19 06:00


 


Pulse Rate  72   08/13/19 06:00


 


Respiratory Rate  20   08/13/19 06:00


 


Blood Pressure  145/54 L  08/13/19 06:00


 


O2 Sat by Pulse Oximetry (%)  91 L  08/12/19 21:01











Constitutional: Yes: Anxious, Obese


Eyes: Yes: WNL


HENT: Yes: WNL


Neck: Yes: WNL


Cardiovascular: Yes: Regular Rate and Rhythm, S1, S2


Respiratory: Yes: WNL


Gastrointestinal: Yes: Soft, Abdomen, Obese


...Rectal Exam: Yes: Deferred


Genitourinary: No: Anuria


Breast(s): Yes: WNL


Musculoskeletal: Yes: Back Pain, Joint Stiffness, Muscle Weakness


Edema: No


Peripheral Pulses WNL: Yes


Integumentary: Yes: WNL


Neurological: Yes: Alert, Oriented, Unsteady Gait, Weakness


Psychiatric: Yes: WNL


Labs: 


 CBC, BMP





 08/13/19 05:55 





 08/13/19 05:55 





 INR, PTT











INR  0.98  (0.83-1.09)   08/11/19  20:35    








 Abnormal Lab Results











  08/13/19 08/13/19 08/13/19





  05:55 05:55 05:55


 


RBC  3.19 L  


 


Hgb  8.6 L  


 


Hct  26.5 L  


 


Neutrophils %  42.6 L D  


 


Monocytes %  14.7 H  


 


Eosinophils %  5.0 H D  


 


Anion Gap   7 L 


 


BUN   23.6 H 


 


Iron    24 L


 


Iron Saturation    6 L


 


Unsaturated IBC    362 H


 


Ferritin    7.7 L


 


HDL Cholesterol    82 H














- ....Imaging


Chest X-ray: Image Reviewed


EKG: Image Reviewed (NSR; no acute changes)





<Bert Cueva - Last Filed: 08/13/19 12:19>





- Current Medication List


Current Medications: 


Active Medications





Amlodipine Besylate (Norvasc -)  2.5 mg PO DAILY [facility]


   Last Admin: 08/12/19 11:02 Dose:  2.5 mg


Furosemide (Lasix Injection -)  40 mg IVPUSH DAILY [facility]


   Last Admin: 08/12/19 15:16 Dose:  40 mg


Heparin Sodium (Porcine) (Heparin -)  5,000 unit SQ BID [facility]


   Last Admin: 08/12/19 22:19 Dose:  5,000 unit


Lisinopril (Prinivil)  40 mg PO DAILY [facility]


   Last Admin: 08/12/19 11:02 Dose:  40 mg


Metoprolol Succinate (Toprol Xl -)  50 mg PO DAILY [facility]


   Last Admin: 08/12/19 11:02 Dose:  50 mg


Pantoprazole Sodium (Protonix -)  40 mg PO DAILY [facility]


   Last Admin: 08/12/19 11:02 Dose:  40 mg


Tramadol HCl (Ultram -)  50 mg PO BID [facility]


   Last Admin: 08/12/19 22:17 Dose:  50 mg











- Objective


Vital Signs: 


 Vital Signs











Temperature  97.9 F   08/13/19 06:00


 


Pulse Rate  72   08/13/19 06:00


 


Respiratory Rate  20   08/13/19 06:00


 


Blood Pressure  145/54 L  08/13/19 06:00


 


O2 Sat by Pulse Oximetry (%)  91 L  08/12/19 21:01











Cardiovascular: Yes: Regular Rate and Rhythm, S1, S2


Gastrointestinal: Yes: Soft, Abdomen, Obese


Labs: 


 CBC, BMP





 08/13/19 05:55 





 08/13/19 05:55 





 INR, PTT











INR  0.98  (0.83-1.09)   08/11/19  20:35    














<Jose Cruz Carl - Last Filed: 08/14/19 10:15>





Problem List





- Problems


(1) Anxiety


Code(s): F41.9 - ANXIETY DISORDER, UNSPECIFIED   





(2) Obesity (BMI 30.0-34.9)


Code(s): E66.9 - OBESITY, UNSPECIFIED   





(3) Arthritis


Code(s): M19.90 - UNSPECIFIED OSTEOARTHRITIS, UNSPECIFIED SITE   





(4) Back pain


Code(s): M54.9 - DORSALGIA, UNSPECIFIED   





(5) HLD (hyperlipidemia)


Code(s): E78.5 - HYPERLIPIDEMIA, UNSPECIFIED   





<Bert Cueva - Last Filed: 08/13/19 12:19>





- Problems


(1) CHF (congestive heart failure)


Assessment/Plan: 








-trop: negative 


-CTA chest: negative for PE


-RLE Doppler: negative for DVT 


-on iv lasix


- Continue with Norvasc 2.5 mg po daily


- Continue with Prinivil 40 mg po daily 


- Continue with Metoprolol 50 mg po daily 


- cardiology follow up 





Code(s): I50.9 - HEART FAILURE, UNSPECIFIED   





(2) Back pain


Assessment/Plan: 


xray thoracic spine


lidoderm patch


Code(s): M54.9 - DORSALGIA, UNSPECIFIED   





(3) Chest pain


Assessment/Plan: 


resolved


see above


Code(s): R07.9 - CHEST PAIN, UNSPECIFIED   





(4) HTN (hypertension), benign


Assessment/Plan: 


 Selected Entries











  08/13/19 08/13/19





  02:00 06:00


 


Blood Pressure 149/79 145/54 L








monitor


Code(s): I10 - ESSENTIAL (PRIMARY) HYPERTENSION   





<Jose Cruz Carl - Last Filed: 08/14/19 10:15>

## 2019-08-13 NOTE — EKG
Test Reason : 

Blood Pressure : ***/*** mmHG

Vent. Rate : 057 BPM     Atrial Rate : 057 BPM

   P-R Int : 182 ms          QRS Dur : 090 ms

    QT Int : 456 ms       P-R-T Axes : 045 -50 034 degrees

   QTc Int : 443 ms

 

SINUS BRADYCARDIA WITH PREMATURE ATRIAL COMPLEXES

LEFT ANTERIOR FASCICULAR BLOCK

NONSPECIFIC T WAVE ABNORMALITY

ABNORMAL ECG

Confirmed by Barrett Jeronimo MD ([id number]) on 8/13/2019 11:26:34 AM

 

Referred By: DAMIAN FERNANDEZ           Confirmed By:Barrett Jeronimo MD

## 2019-08-13 NOTE — PN
Progress Note, Physician


Chief Complaint: 





PT OOB in chair; home aide is at her side. Pt has no back pain since receiving 

medication today (Ultram).


History of Present Illness: 





86 F with h/o diastolic CHF, HTN, HLD, overweight, arthritis, anxiety, now 

presenting with sudden onset chest pain radiating to back. Pt states she was at 

rest when pain began. Denies exertional or pleuritic component to it. Pt also 

endorses BLE swelling. Denies h/o DVT/PE. Denies F/C/cough.








- Current Medication List


Current Medications: 


Active Medications





Amlodipine Besylate (Norvasc -)  2.5 mg PO DAILY [facility]


   Last Admin: 08/13/19 09:12 Dose:  2.5 mg


Furosemide (Lasix Injection -)  40 mg IVPUSH DAILY [facility]


   Last Admin: 08/13/19 09:13 Dose:  40 mg


Heparin Sodium (Porcine) (Heparin -)  5,000 unit SQ BID [facility]


   Last Admin: 08/13/19 09:14 Dose:  5,000 unit


Lidocaine (Lidoderm Patch -)  1 patch TP DAILY [facility]


Lisinopril (Prinivil)  40 mg PO DAILY [facility]


   Last Admin: 08/13/19 09:13 Dose:  40 mg


Metoprolol Succinate (Toprol Xl -)  50 mg PO DAILY [facility]


   Last Admin: 08/13/19 09:12 Dose:  50 mg


Miscellaneous (Lidoderm Patch Removal)  1 each MC DAILY@2200 [facility]


Pantoprazole Sodium (Protonix -)  40 mg PO DAILY [facility]


   Last Admin: 08/13/19 09:12 Dose:  40 mg


Tramadol HCl (Ultram -)  50 mg PO BID [facility]


   Last Admin: 08/13/19 09:12 Dose:  50 mg











- Objective


Vital Signs: 


 Vital Signs











Temperature  97.9 F   08/13/19 06:00


 


Pulse Rate  72   08/13/19 06:00


 


Respiratory Rate  20   08/13/19 06:00


 


Blood Pressure  145/54 L  08/13/19 06:00


 


O2 Sat by Pulse Oximetry (%)  91 L  08/12/19 21:01











Constitutional: Yes: Anxious


Labs: 


 CBC, BMP





 08/13/19 05:55 





 08/13/19 05:55 





 INR, PTT











INR  0.98  (0.83-1.09)   08/11/19  20:35    














Problem List





- Problems


(1) Anemia


Assessment/Plan: 


chronic anemia; followed by Dr. Peralta.


F/u Hb, Fe


Code(s): D64.9 - ANEMIA, UNSPECIFIED   





(2) Anxiety


Code(s): F41.9 - ANXIETY DISORDER, UNSPECIFIED   





(3) Arthritis


Code(s): M19.90 - UNSPECIFIED OSTEOARTHRITIS, UNSPECIFIED SITE   





(4) Back pain


Assessment/Plan: 


Pt has had several episodes of stabbing upper-central back pain at rest, last a 

few seconds, for the past few weeks. 


She denies having had chest pain during these episodes or at any other time. (

For years, she has had exertional fatigue on walking more than a block; this 

has not changed appreciably. She has been offered cardiac stress test in the 

past, but deferred having it. Will continue to follow her as outpt, and plan 

for outpt stress MIBI if she agrees).


CT chest and abdomen: no PE, no aortic dilatation/dissection; no acute bony 

pathology.


ECHO: normal LVEF; no wall motion abnormalities noted; abnormal diastolic 

compliance. 


EKG: NSR; no acute ST-T changes.


TNI: < 0.02 x 2.





Recommend:


Discussed pain management, physical therapy, and weight loss with her and her 

aide.





From cardiac perspective, pt may be followed up as an outpatient.








Code(s): M54.9 - DORSALGIA, UNSPECIFIED   





(5) GERD (gastroesophageal reflux disease)


Code(s): K21.9 - GASTRO-ESOPHAGEAL REFLUX DISEASE WITHOUT ESOPHAGITIS   





(6) HLD (hyperlipidemia)


Code(s): E78.5 - HYPERLIPIDEMIA, UNSPECIFIED   





(7) Obesity (BMI 30.0-34.9)


Code(s): E66.9 - OBESITY, UNSPECIFIED   





(8) HTN (hypertension), benign


Code(s): I10 - ESSENTIAL (PRIMARY) HYPERTENSION

## 2019-08-14 VITALS — DIASTOLIC BLOOD PRESSURE: 59 MMHG | SYSTOLIC BLOOD PRESSURE: 155 MMHG | HEART RATE: 62 BPM

## 2019-08-14 VITALS — TEMPERATURE: 97.9 F

## 2019-08-14 LAB
ALBUMIN SERPL-MCNC: 3.3 G/DL (ref 3.4–5)
ALP SERPL-CCNC: 69 U/L (ref 45–117)
ALT SERPL-CCNC: 16 U/L (ref 13–61)
ANION GAP SERPL CALC-SCNC: 8 MMOL/L (ref 8–16)
AST SERPL-CCNC: 21 U/L (ref 15–37)
BASOPHILS # BLD: 1 % (ref 0–2)
BILIRUB SERPL-MCNC: 0.4 MG/DL (ref 0.2–1)
BUN SERPL-MCNC: 22.6 MG/DL (ref 7–18)
CALCIUM SERPL-MCNC: 8.8 MG/DL (ref 8.5–10.1)
CHLORIDE SERPL-SCNC: 103 MMOL/L (ref 98–107)
CO2 SERPL-SCNC: 29 MMOL/L (ref 21–32)
CREAT SERPL-MCNC: 0.9 MG/DL (ref 0.55–1.3)
DEPRECATED RDW RBC AUTO: 15 % (ref 11.6–15.6)
EOSINOPHIL # BLD: 5.7 % (ref 0–4.5)
GLUCOSE SERPL-MCNC: 88 MG/DL (ref 74–106)
HCT VFR BLD CALC: 26.3 % (ref 32.4–45.2)
HGB BLD-MCNC: 8.6 GM/DL (ref 10.7–15.3)
LYMPHOCYTES # BLD: 37.2 % (ref 8–40)
MCH RBC QN AUTO: 27 PG (ref 25.7–33.7)
MCHC RBC AUTO-ENTMCNC: 32.7 G/DL (ref 32–36)
MCV RBC: 82.4 FL (ref 80–96)
MONOCYTES # BLD AUTO: 14.8 % (ref 3.8–10.2)
NEUTROPHILS # BLD: 41.3 % (ref 42.8–82.8)
PLATELET # BLD AUTO: 161 K/MM3 (ref 134–434)
PMV BLD: 9.4 FL (ref 7.5–11.1)
POTASSIUM SERPLBLD-SCNC: 3.6 MMOL/L (ref 3.5–5.1)
PROT SERPL-MCNC: 7.1 G/DL (ref 6.4–8.2)
RBC # BLD AUTO: 3.19 M/MM3 (ref 3.6–5.2)
SODIUM SERPL-SCNC: 140 MMOL/L (ref 136–145)
WBC # BLD AUTO: 4.7 K/MM3 (ref 4–10)

## 2019-08-14 RX ADMIN — AMLODIPINE BESYLATE SCH MG: 2.5 TABLET ORAL at 11:29

## 2019-08-14 RX ADMIN — FUROSEMIDE SCH MG: 10 INJECTION, SOLUTION INTRAVENOUS at 10:00

## 2019-08-14 RX ADMIN — HEPARIN SODIUM SCH UNIT: 5000 INJECTION, SOLUTION INTRAVENOUS; SUBCUTANEOUS at 10:00

## 2019-08-14 RX ADMIN — PANTOPRAZOLE SODIUM SCH MG: 40 TABLET, DELAYED RELEASE ORAL at 11:29

## 2019-08-14 RX ADMIN — LIDOCAINE SCH PATCH: 50 PATCH TOPICAL at 11:29

## 2019-08-14 RX ADMIN — LISINOPRIL SCH MG: 20 TABLET ORAL at 11:28

## 2019-08-14 NOTE — DS
Physical Examination


Vital Signs: 


 Vital Signs











Temperature  97.9 F   08/14/19 06:00


 


Pulse Rate  57 L  08/14/19 06:00


 


Respiratory Rate  20   08/14/19 06:00


 


Blood Pressure  149/68   08/14/19 06:00


 


O2 Sat by Pulse Oximetry (%)  97   08/13/19 21:00











Cardiovascular: Yes: Regular Rate and Rhythm


Respiratory: Yes: Regular, CTA Bilaterally


Gastrointestinal: Yes: Normal Bowel Sounds, Soft


Labs: 


 CBC, BMP





 08/14/19 06:35 





 08/14/19 06:35 











Discharge Summary


Reason For Visit: SWELLING OF RIGHT LOWER EXTREMITY


Current Active Problems





Anemia (Acute)


Anxiety (Acute)


Arthritis (Acute)


Back pain (Acute)


CHF (congestive heart failure) (Acute)


Chest pain (Acute)


GERD (gastroesophageal reflux disease) (Acute)


HLD (hyperlipidemia) (Acute)


Obesity (BMI 30.0-34.9) (Acute)


Right leg swelling (Acute)








Hospital Course: 





- Problems


(1) CHF (congestive heart failure)


Assessment/Plan: 








-trop: negative 


-CTA chest: negative for PE


-RLE Doppler: negative for DVT 


-on iv lasix


- Continue with Norvasc 2.5 mg po daily


- Continue with Prinivil 40 mg po daily 


- Continue with Metoprolol 50 mg po daily 


- cardiology follow up 





Code(s): I50.9 - HEART FAILURE, UNSPECIFIED   





(2) Back pain


Assessment/Plan: 


improved


xray thoracic spine


lidoderm patch


Code(s): M54.9 - DORSALGIA, UNSPECIFIED   





(3) Chest pain


Assessment/Plan: 


resolved


see above


Code(s): R07.9 - CHEST PAIN, UNSPECIFIED   





(4) HTN (hypertension), benign


Assessment/Plan: 


 Selected Entries











  08/13/19 08/13/19





  02:00 06:00


 


Blood Pressure 149/79 145/54 L








monitor


Code(s): I10 - ESSENTIAL (PRIMARY) HYPERTENSION   





dc planning--home further follow up and testing as outpatient


Condition: Improved





- Instructions


Diet, Activity, Other Instructions: 


see dr damon in one week for blood test


Referrals: 


Santos Damon MD [Staff Physician] - 


Jean Pierre Baker MD [Staff Physician] - 


Familia Goodrich MD [Staff Physician] - 


Disposition: HOME





- Home Medications


Comprehensive Discharge Medication List: 


Ambulatory Orders





Amlodipine Besylate [Norvasc -] 2.5 mg PO DAILY 10/22/16 


Lansoprazole [Prevacid] 30 mg PO DAILY 10/22/16 


Lisinopril [Prinivil -] 40 mg PO DAILY 10/22/16 


Metoprolol Succinate [Toprol Xl] 50 mg PO DAILY 10/22/16 


Tramadol HCl 50 mg PO BID 10/22/16 


Furosemide [Lasix -] 40 mg PO DAILY #30 tablet 08/14/19 


Levothyroxine [Synthroid -] 12.5 mcg PO DAILY@0700 #15 tablet 08/14/19 


Lidocaine 5% Patch [Lidoderm -] 1 patch TP DAILY #30 patch 08/14/19

## 2019-08-14 NOTE — EKG
Test Reason : 

Blood Pressure : ***/*** mmHG

Vent. Rate : 058 BPM     Atrial Rate : 058 BPM

   P-R Int : 176 ms          QRS Dur : 082 ms

    QT Int : 416 ms       P-R-T Axes : 070 -43 028 degrees

   QTc Int : 408 ms

 

SINUS BRADYCARDIA

LEFT AXIS DEVIATION

LOW VOLTAGE QRS

CANNOT RULE OUT ANTERIOR INFARCT , AGE UNDETERMINED

ABNORMAL ECG

WHEN COMPARED WITH ECG OF 12-AUG-2019 13:21,

PREMATURE ATRIAL COMPLEXES ARE NO LONGER PRESENT

Confirmed by HARIKA HINKLE, DAMIAN ([id number]) on 8/14/2019 10:48:58 AM

 

Referred By:             Confirmed By:DAMIAN SHABAZZ MD

## 2019-08-14 NOTE — PN
Progress Note, Physician


History of Present Illness: 





86 year old female with PMHx of HTN/HLD, Arthritis, arrived to ED for complain 

of upper back pain (around right shoulder blade) with occasional sob, pain 

present for 2 days as per patient took Tylenol with relief,  also complains of B

/L LE swelling. Denies headache dizziness, fever chills abdominal pain or 

dysuria. 





- Current Medication List


Current Medications: 


Active Medications





Acetaminophen (Tylenol -)  650 mg PO Q6H PRN


   PRN Reason: PAIN OR FEVER


Amlodipine Besylate (Norvasc -)  2.5 mg PO DAILY [facility]


   Last Admin: 08/13/19 09:12 Dose:  2.5 mg


Furosemide (Lasix -)  40 mg PO DAILY [facility]


Levothyroxine Sodium (Synthroid -)  12.5 mcg PO DAILY@0700 [facility]


Lidocaine (Lidoderm Patch -)  1 patch TP DAILY [facility]


   Last Admin: 08/13/19 10:00 Dose:  1 patch


Lisinopril (Prinivil)  40 mg PO DAILY [facility]


   Last Admin: 08/13/19 09:13 Dose:  40 mg


Metoprolol Succinate (Toprol Xl -)  50 mg PO DAILY [facility]


   Last Admin: 08/13/19 09:12 Dose:  50 mg


Miscellaneous (Lidoderm Patch Removal)  1 each MC DAILY@2200 [facility]


   Last Admin: 08/13/19 21:31 Dose:  1 each


Pantoprazole Sodium (Protonix -)  40 mg PO DAILY [facility]


   Last Admin: 08/13/19 09:12 Dose:  40 mg


Tramadol HCl (Ultram -)  50 mg PO BID [facility]


   Last Admin: 08/13/19 21:29 Dose:  50 mg











- Objective


Vital Signs: 


 Vital Signs











Temperature  97.9 F   08/14/19 06:00


 


Pulse Rate  57 L  08/14/19 06:00


 


Respiratory Rate  20   08/14/19 06:00


 


Blood Pressure  149/68   08/14/19 06:00


 


O2 Sat by Pulse Oximetry (%)  97   08/13/19 21:00











Eyes: Yes: WNL, Conjunctiva Clear, EOM Intact


HENT: Yes: WNL, Atraumatic, Normocephalic


Neck: Yes: WNL, Supple, Trachea Midline


Cardiovascular: Yes: WNL, Regular Rate and Rhythm


Respiratory: Yes: WNL, Regular, CTA Bilaterally


Gastrointestinal: Yes: WNL, Normal Bowel Sounds


Genitourinary: Yes: WNL


Musculoskeletal: Yes: WNL


Extremities: Yes: WNL


Edema: No


Integumentary: Yes: WNL


Neurological: Yes: WNL, Alert, Oriented


...Motor Strength: WNL


Psychiatric: Yes: WNL


Labs: 


 CBC, BMP





 08/14/19 06:35 





 08/14/19 06:35 





 INR, PTT











INR  0.98  (0.83-1.09)   08/11/19  20:35    














Problem List





- Problems


(1) Arthritis


Code(s): M19.90 - UNSPECIFIED OSTEOARTHRITIS, UNSPECIFIED SITE   





(2) CHF (congestive heart failure)


Code(s): I50.9 - HEART FAILURE, UNSPECIFIED   





(3) Chest pain


Code(s): R07.9 - CHEST PAIN, UNSPECIFIED   





(4) GERD (gastroesophageal reflux disease)


Code(s): K21.9 - GASTRO-ESOPHAGEAL REFLUX DISEASE WITHOUT ESOPHAGITIS   





(5) HLD (hyperlipidemia)


Code(s): E78.5 - HYPERLIPIDEMIA, UNSPECIFIED   





(6) Right leg swelling


Code(s): M79.89 - OTHER SPECIFIED SOFT TISSUE DISORDERS   





(7) DVT prophylaxis


Code(s): NJI8812 -    





(8) Gastritis


Code(s): K29.70 - GASTRITIS, UNSPECIFIED, WITHOUT BLEEDING   





(9) Callus of foot


Code(s): L84 - CORNS AND CALLOSITIES   





(10) HTN (hypertension), benign


Code(s): I10 - ESSENTIAL (PRIMARY) HYPERTENSION   





Assessment/Plan








- Problems


(1) Anemia


Assessment/Plan: 


chronic anemia; followed by Dr. Peralta.


F/u Hb, Fe


Code(s): D64.9 - ANEMIA, UNSPECIFIED   





(2) Anxiety


Code(s): F41.9 - ANXIETY DISORDER, UNSPECIFIED   





(3) Arthritis


Code(s): M19.90 - UNSPECIFIED OSTEOARTHRITIS, UNSPECIFIED SITE   





(4) Back pain


Assessment/Plan: 


Pt has had several episodes of stabbing upper-central back pain at rest, last a 

few seconds, for the past few weeks. 


She denies having had chest pain during these episodes or at any other time. (

For years, she has had exertional fatigue on walking more than a block; this 

has not changed appreciably. She has been offered cardiac stress test in the 

past, but deferred having it. Will continue to follow her as outpt, and plan 

for outpt stress MIBI if she agrees).


CT chest and abdomen: no PE, no aortic dilatation/dissection; no acute bony 

pathology.


ECHO: normal LVEF; no wall motion abnormalities noted; abnormal diastolic 

compliance. 


EKG: NSR; no acute ST-T changes.


TNI: < 0.02 x 2.





Recommend:


Discussed pain management, physical therapy, and weight loss with her and her 

aide.





From cardiac perspective, pt may be followed up as an outpatient.








Code(s): M54.9 - DORSALGIA, UNSPECIFIED   





(5) GERD (gastroesophageal reflux disease)


Code(s): K21.9 - GASTRO-ESOPHAGEAL REFLUX DISEASE WITHOUT ESOPHAGITIS   





(6) HLD (hyperlipidemia)


Code(s): E78.5 - HYPERLIPIDEMIA, UNSPECIFIED   





(7) Obesity (BMI 30.0-34.9)


Code(s): E66.9 - OBESITY, UNSPECIFIED   





(8) HTN (hypertension), benign


Code(s): I10 - ESSENTIAL (PRIMARY) HYPERTENSION

## 2022-05-27 ENCOUNTER — HOSPITAL ENCOUNTER (INPATIENT)
Dept: HOSPITAL 74 - JER | Age: 87
LOS: 4 days | Discharge: HOME | DRG: 291 | End: 2022-05-31
Attending: FAMILY MEDICINE | Admitting: HOSPITALIST
Payer: COMMERCIAL

## 2022-05-27 VITALS — BODY MASS INDEX: 35.2 KG/M2

## 2022-05-27 DIAGNOSIS — B95.1: ICD-10-CM

## 2022-05-27 DIAGNOSIS — R42: ICD-10-CM

## 2022-05-27 DIAGNOSIS — N39.0: ICD-10-CM

## 2022-05-27 DIAGNOSIS — K21.9: ICD-10-CM

## 2022-05-27 DIAGNOSIS — E78.5: ICD-10-CM

## 2022-05-27 DIAGNOSIS — G47.33: ICD-10-CM

## 2022-05-27 DIAGNOSIS — E03.9: ICD-10-CM

## 2022-05-27 DIAGNOSIS — D64.9: ICD-10-CM

## 2022-05-27 DIAGNOSIS — I50.33: ICD-10-CM

## 2022-05-27 DIAGNOSIS — M81.0: ICD-10-CM

## 2022-05-27 DIAGNOSIS — E66.9: ICD-10-CM

## 2022-05-27 DIAGNOSIS — I45.10: ICD-10-CM

## 2022-05-27 DIAGNOSIS — Z88.0: ICD-10-CM

## 2022-05-27 DIAGNOSIS — F41.9: ICD-10-CM

## 2022-05-27 DIAGNOSIS — I11.0: Primary | ICD-10-CM

## 2022-05-27 LAB
ALBUMIN SERPL-MCNC: 3.5 G/DL (ref 3.4–5)
ALP SERPL-CCNC: 80 U/L (ref 45–117)
ALT SERPL-CCNC: 21 U/L (ref 13–61)
ANION GAP SERPL CALC-SCNC: 5 MMOL/L (ref 8–16)
ANISOCYTOSIS BLD QL: (no result)
APPEARANCE UR: CLEAR
APTT BLD: 26.9 SECONDS (ref 25.2–36.5)
AST SERPL-CCNC: 19 U/L (ref 15–37)
BACTERIA # UR AUTO: 185 /UL (ref 0–1359)
BASOPHILS # BLD: 1.5 % (ref 0–2)
BILIRUB SERPL-MCNC: 0.4 MG/DL (ref 0.2–1)
BILIRUB UR STRIP.AUTO-MCNC: NEGATIVE MG/DL
BNP SERPL-MCNC: 1020 PG/ML (ref 5–450)
BUN SERPL-MCNC: 18.4 MG/DL (ref 7–18)
CALCIUM SERPL-MCNC: 8.9 MG/DL (ref 8.5–10.1)
CASTS URNS QL MICRO: 0 /UL (ref 0–3.1)
CHLORIDE SERPL-SCNC: 106 MMOL/L (ref 98–107)
CO2 SERPL-SCNC: 27 MMOL/L (ref 21–32)
COLOR UR: YELLOW
CREAT SERPL-MCNC: 0.8 MG/DL (ref 0.55–1.3)
DEPRECATED RDW RBC AUTO: 19.4 % (ref 11.6–15.6)
EOSINOPHIL # BLD: 2.3 % (ref 0–4.5)
EPITH CASTS URNS QL MICRO: 9 /UL (ref 0–25.1)
GLUCOSE SERPL-MCNC: 92 MG/DL (ref 74–106)
HCT VFR BLD CALC: 25.1 % (ref 32.4–45.2)
HGB BLD-MCNC: 7.8 GM/DL (ref 10.7–15.3)
INR BLD: 1.01 (ref 0.83–1.09)
KETONES UR QL STRIP: NEGATIVE
LEUKOCYTE ESTERASE UR QL STRIP.AUTO: (no result)
LYMPHOCYTES # BLD: 26.7 % (ref 8–40)
MACROCYTES BLD QL: 0
MAGNESIUM SERPL-MCNC: 2.4 MG/DL (ref 1.8–2.4)
MCH RBC QN AUTO: 21.6 PG (ref 25.7–33.7)
MCHC RBC AUTO-ENTMCNC: 30.9 G/DL (ref 32–36)
MCV RBC: 69.9 FL (ref 80–96)
MONOCYTES # BLD AUTO: 13.1 % (ref 3.8–10.2)
NEUTROPHILS # BLD: 56.4 % (ref 42.8–82.8)
NITRITE UR QL STRIP: NEGATIVE
PH UR: 6.5 [PH] (ref 5–8)
PLATELET # BLD AUTO: 147 10^3/UL (ref 134–434)
PMV BLD: 9.4 FL (ref 7.5–11.1)
PROT SERPL-MCNC: 7.1 G/DL (ref 6.4–8.2)
PROT UR QL STRIP: (no result)
PROT UR QL STRIP: NEGATIVE
PT PNL PPP: 11.6 SEC (ref 9.7–13)
RBC # BLD AUTO: 1 /UL (ref 0–23.9)
RBC # BLD AUTO: 3.59 M/MM3 (ref 3.6–5.2)
SODIUM SERPL-SCNC: 138 MMOL/L (ref 136–145)
SP GR UR: 1.01 (ref 1.01–1.03)
UROBILINOGEN UR STRIP-MCNC: 0.2 MG/DL (ref 0.2–1)
WBC # BLD AUTO: 4.8 K/MM3 (ref 4–10)
WBC # UR AUTO: 12 /UL (ref 0–25.8)

## 2022-05-27 PROCEDURE — U0003 INFECTIOUS AGENT DETECTION BY NUCLEIC ACID (DNA OR RNA); SEVERE ACUTE RESPIRATORY SYNDROME CORONAVIRUS 2 (SARS-COV-2) (CORONAVIRUS DISEASE [COVID-19]), AMPLIFIED PROBE TECHNIQUE, MAKING USE OF HIGH THROUGHPUT TECHNOLOGIES AS DESCRIBED BY CMS-2020-01-R: HCPCS

## 2022-05-27 PROCEDURE — U0005 INFEC AGEN DETEC AMPLI PROBE: HCPCS

## 2022-05-28 LAB
ANION GAP SERPL CALC-SCNC: 6 MMOL/L (ref 8–16)
BASOPHILS # BLD: 1.6 % (ref 0–2)
BUN SERPL-MCNC: 18.6 MG/DL (ref 7–18)
CALCIUM SERPL-MCNC: 9 MG/DL (ref 8.5–10.1)
CHLORIDE SERPL-SCNC: 103 MMOL/L (ref 98–107)
CO2 SERPL-SCNC: 29 MMOL/L (ref 21–32)
CREAT SERPL-MCNC: 1.1 MG/DL (ref 0.55–1.3)
DEPRECATED RDW RBC AUTO: 19 % (ref 11.6–15.6)
EOSINOPHIL # BLD: 3.9 % (ref 0–4.5)
GLUCOSE SERPL-MCNC: 112 MG/DL (ref 74–106)
HCT VFR BLD CALC: 25.7 % (ref 32.4–45.2)
HGB BLD-MCNC: 7.9 GM/DL (ref 10.7–15.3)
LYMPHOCYTES # BLD: 27.8 % (ref 8–40)
MCH RBC QN AUTO: 21.5 PG (ref 25.7–33.7)
MCHC RBC AUTO-ENTMCNC: 30.6 G/DL (ref 32–36)
MCV RBC: 70.2 FL (ref 80–96)
MONOCYTES # BLD AUTO: 14.1 % (ref 3.8–10.2)
NEUTROPHILS # BLD: 52.6 % (ref 42.8–82.8)
PLATELET # BLD AUTO: 147 10^3/UL (ref 134–434)
PMV BLD: 9.5 FL (ref 7.5–11.1)
RBC # BLD AUTO: 3.67 M/MM3 (ref 3.6–5.2)
SODIUM SERPL-SCNC: 138 MMOL/L (ref 136–145)
TIBC SERPL-MCNC: 431 UG/DL (ref 250–450)
WBC # BLD AUTO: 5.3 K/MM3 (ref 4–10)

## 2022-05-28 RX ADMIN — LIDOCAINE SCH PATCH: 50 PATCH TOPICAL at 09:30

## 2022-05-28 RX ADMIN — AMLODIPINE BESYLATE SCH MG: 2.5 TABLET ORAL at 09:30

## 2022-05-28 RX ADMIN — CEFTRIAXONE SCH MLS/HR: 1 INJECTION, POWDER, FOR SOLUTION INTRAMUSCULAR; INTRAVENOUS at 17:10

## 2022-05-28 RX ADMIN — PANTOPRAZOLE SODIUM SCH MG: 40 TABLET, DELAYED RELEASE ORAL at 09:30

## 2022-05-28 RX ADMIN — LISINOPRIL SCH MG: 20 TABLET ORAL at 09:30

## 2022-05-28 RX ADMIN — LIDOCAINE SCH: 50 PATCH TOPICAL at 09:38

## 2022-05-28 RX ADMIN — LEVOTHYROXINE SODIUM SCH MCG: 25 TABLET ORAL at 06:34

## 2022-05-28 RX ADMIN — ATORVASTATIN CALCIUM SCH MG: 10 TABLET, FILM COATED ORAL at 21:40

## 2022-05-28 RX ADMIN — Medication SCH: at 21:43

## 2022-05-29 RX ADMIN — ATORVASTATIN CALCIUM SCH MG: 10 TABLET, FILM COATED ORAL at 21:06

## 2022-05-29 RX ADMIN — PANTOPRAZOLE SODIUM SCH MG: 40 TABLET, DELAYED RELEASE ORAL at 10:07

## 2022-05-29 RX ADMIN — CEFTRIAXONE SCH MLS/HR: 1 INJECTION, POWDER, FOR SOLUTION INTRAMUSCULAR; INTRAVENOUS at 10:11

## 2022-05-29 RX ADMIN — AMLODIPINE BESYLATE SCH MG: 2.5 TABLET ORAL at 10:07

## 2022-05-29 RX ADMIN — LISINOPRIL SCH MG: 20 TABLET ORAL at 10:07

## 2022-05-29 RX ADMIN — LEVOTHYROXINE SODIUM SCH MCG: 25 TABLET ORAL at 06:37

## 2022-05-29 RX ADMIN — FUROSEMIDE SCH MG: 40 TABLET ORAL at 10:07

## 2022-05-29 RX ADMIN — LIDOCAINE SCH: 50 PATCH TOPICAL at 10:08

## 2022-05-29 RX ADMIN — Medication SCH: at 21:07

## 2022-05-30 LAB
ALBUMIN SERPL-MCNC: 3.6 G/DL (ref 3.4–5)
ALP SERPL-CCNC: 88 U/L (ref 45–117)
ALT SERPL-CCNC: 19 U/L (ref 13–61)
ANION GAP SERPL CALC-SCNC: 8 MMOL/L (ref 8–16)
AST SERPL-CCNC: 20 U/L (ref 15–37)
BASOPHILS # BLD: 2 % (ref 0–2)
BILIRUB SERPL-MCNC: 0.5 MG/DL (ref 0.2–1)
BUN SERPL-MCNC: 27.9 MG/DL (ref 7–18)
CALCIUM SERPL-MCNC: 9.1 MG/DL (ref 8.5–10.1)
CHLORIDE SERPL-SCNC: 103 MMOL/L (ref 98–107)
CO2 SERPL-SCNC: 26 MMOL/L (ref 21–32)
CREAT SERPL-MCNC: 1 MG/DL (ref 0.55–1.3)
DEPRECATED RDW RBC AUTO: 19.3 % (ref 11.6–15.6)
EOSINOPHIL # BLD: 4.9 % (ref 0–4.5)
GLUCOSE SERPL-MCNC: 114 MG/DL (ref 74–106)
HCT VFR BLD CALC: 29 % (ref 32.4–45.2)
HGB BLD-MCNC: 9 GM/DL (ref 10.7–15.3)
LYMPHOCYTES # BLD: 29.9 % (ref 8–40)
MCH RBC QN AUTO: 21.6 PG (ref 25.7–33.7)
MCHC RBC AUTO-ENTMCNC: 31 G/DL (ref 32–36)
MCV RBC: 69.7 FL (ref 80–96)
MONOCYTES # BLD AUTO: 12.1 % (ref 3.8–10.2)
NEUTROPHILS # BLD: 51.1 % (ref 42.8–82.8)
PLATELET # BLD AUTO: 169 10^3/UL (ref 134–434)
PMV BLD: 10 FL (ref 7.5–11.1)
PROT SERPL-MCNC: 8.1 G/DL (ref 6.4–8.2)
RBC # BLD AUTO: 4.17 M/MM3 (ref 3.6–5.2)
SODIUM SERPL-SCNC: 137 MMOL/L (ref 136–145)
WBC # BLD AUTO: 5.3 K/MM3 (ref 4–10)

## 2022-05-30 RX ADMIN — LEVOTHYROXINE SODIUM SCH MCG: 25 TABLET ORAL at 06:15

## 2022-05-30 RX ADMIN — LIDOCAINE SCH: 50 PATCH TOPICAL at 09:47

## 2022-05-30 RX ADMIN — PANTOPRAZOLE SODIUM SCH MG: 40 TABLET, DELAYED RELEASE ORAL at 09:45

## 2022-05-30 RX ADMIN — LISINOPRIL SCH MG: 20 TABLET ORAL at 09:45

## 2022-05-30 RX ADMIN — Medication SCH: at 21:50

## 2022-05-30 RX ADMIN — CEFTRIAXONE SCH MLS/HR: 1 INJECTION, POWDER, FOR SOLUTION INTRAMUSCULAR; INTRAVENOUS at 09:45

## 2022-05-30 RX ADMIN — ATORVASTATIN CALCIUM SCH MG: 10 TABLET, FILM COATED ORAL at 21:50

## 2022-05-30 RX ADMIN — FUROSEMIDE SCH MG: 40 TABLET ORAL at 09:45

## 2022-05-30 RX ADMIN — AMLODIPINE BESYLATE SCH MG: 2.5 TABLET ORAL at 09:45

## 2022-05-31 VITALS — SYSTOLIC BLOOD PRESSURE: 137 MMHG | HEART RATE: 76 BPM | TEMPERATURE: 97.6 F | DIASTOLIC BLOOD PRESSURE: 59 MMHG

## 2022-05-31 LAB
ALBUMIN SERPL-MCNC: 3.7 G/DL (ref 3.4–5)
ALP SERPL-CCNC: 83 U/L (ref 45–117)
ALT SERPL-CCNC: 17 U/L (ref 13–61)
ANION GAP SERPL CALC-SCNC: 9 MMOL/L (ref 8–16)
ANISOCYTOSIS BLD QL: (no result)
AST SERPL-CCNC: 22 U/L (ref 15–37)
BASOPHILS # BLD: 1.3 % (ref 0–2)
BILIRUB SERPL-MCNC: 0.5 MG/DL (ref 0.2–1)
BUN SERPL-MCNC: 28.2 MG/DL (ref 7–18)
CALCIUM SERPL-MCNC: 9.3 MG/DL (ref 8.5–10.1)
CHLORIDE SERPL-SCNC: 102 MMOL/L (ref 98–107)
CO2 SERPL-SCNC: 24 MMOL/L (ref 21–32)
CREAT SERPL-MCNC: 1 MG/DL (ref 0.55–1.3)
DEPRECATED RDW RBC AUTO: 19.3 % (ref 11.6–15.6)
EOSINOPHIL # BLD: 2.8 % (ref 0–4.5)
GLUCOSE SERPL-MCNC: 120 MG/DL (ref 74–106)
HCT VFR BLD CALC: 28.6 % (ref 32.4–45.2)
HGB BLD-MCNC: 9 GM/DL (ref 10.7–15.3)
LYMPHOCYTES # BLD: 23.2 % (ref 8–40)
MCH RBC QN AUTO: 21.9 PG (ref 25.7–33.7)
MCHC RBC AUTO-ENTMCNC: 31.3 G/DL (ref 32–36)
MCV RBC: 69.8 FL (ref 80–96)
MONOCYTES # BLD AUTO: 12.4 % (ref 3.8–10.2)
NEUTROPHILS # BLD: 60.3 % (ref 42.8–82.8)
PLATELET # BLD AUTO: 158 10^3/UL (ref 134–434)
PLATELET BLD QL SMEAR: ADEQUATE
PMV BLD: 9.2 FL (ref 7.5–11.1)
PROT SERPL-MCNC: 7.8 G/DL (ref 6.4–8.2)
RBC # BLD AUTO: 4.1 M/MM3 (ref 3.6–5.2)
SODIUM SERPL-SCNC: 136 MMOL/L (ref 136–145)
WBC # BLD AUTO: 5.2 K/MM3 (ref 4–10)

## 2022-05-31 RX ADMIN — PANTOPRAZOLE SODIUM SCH MG: 40 TABLET, DELAYED RELEASE ORAL at 09:30

## 2022-05-31 RX ADMIN — CEFTRIAXONE SCH MLS/HR: 1 INJECTION, POWDER, FOR SOLUTION INTRAMUSCULAR; INTRAVENOUS at 09:31

## 2022-05-31 RX ADMIN — FUROSEMIDE SCH MG: 40 TABLET ORAL at 09:30

## 2022-05-31 RX ADMIN — AMLODIPINE BESYLATE SCH MG: 2.5 TABLET ORAL at 09:31

## 2022-05-31 RX ADMIN — LISINOPRIL SCH MG: 20 TABLET ORAL at 09:30

## 2022-05-31 RX ADMIN — CEFTRIAXONE SCH: 1 INJECTION, POWDER, FOR SOLUTION INTRAMUSCULAR; INTRAVENOUS at 09:52

## 2022-05-31 RX ADMIN — LIDOCAINE SCH: 50 PATCH TOPICAL at 09:30

## 2022-12-07 ENCOUNTER — HOSPITAL ENCOUNTER (EMERGENCY)
Dept: HOSPITAL 74 - JER | Age: 87
Discharge: HOME | End: 2022-12-07
Payer: COMMERCIAL

## 2022-12-07 VITALS — TEMPERATURE: 97.7 F | RESPIRATION RATE: 18 BRPM

## 2022-12-07 VITALS — HEART RATE: 62 BPM | SYSTOLIC BLOOD PRESSURE: 128 MMHG | DIASTOLIC BLOOD PRESSURE: 76 MMHG

## 2022-12-07 VITALS — BODY MASS INDEX: 29.2 KG/M2

## 2022-12-07 DIAGNOSIS — M25.551: Primary | ICD-10-CM

## 2023-01-11 ENCOUNTER — HOSPITAL ENCOUNTER (OUTPATIENT)
Dept: HOSPITAL 74 - JCHEMO | Age: 88
Discharge: HOME | End: 2023-01-11
Attending: INTERNAL MEDICINE
Payer: COMMERCIAL

## 2023-01-11 VITALS
DIASTOLIC BLOOD PRESSURE: 51 MMHG | HEART RATE: 63 BPM | TEMPERATURE: 98.4 F | SYSTOLIC BLOOD PRESSURE: 138 MMHG | RESPIRATION RATE: 18 BRPM

## 2023-01-11 DIAGNOSIS — D50.9: Primary | ICD-10-CM

## 2023-01-11 PROCEDURE — 3E033GC INTRODUCTION OF OTHER THERAPEUTIC SUBSTANCE INTO PERIPHERAL VEIN, PERCUTANEOUS APPROACH: ICD-10-PCS | Performed by: INTERNAL MEDICINE

## 2023-01-18 ENCOUNTER — HOSPITAL ENCOUNTER (OUTPATIENT)
Dept: HOSPITAL 74 - JCHEMO | Age: 88
Discharge: HOME | End: 2023-01-18
Attending: INTERNAL MEDICINE
Payer: COMMERCIAL

## 2023-01-18 VITALS — TEMPERATURE: 97.6 F

## 2023-01-18 VITALS — SYSTOLIC BLOOD PRESSURE: 111 MMHG | DIASTOLIC BLOOD PRESSURE: 44 MMHG | HEART RATE: 61 BPM | RESPIRATION RATE: 18 BRPM

## 2023-01-18 DIAGNOSIS — D50.9: Primary | ICD-10-CM

## 2023-01-18 PROCEDURE — 3E033GC INTRODUCTION OF OTHER THERAPEUTIC SUBSTANCE INTO PERIPHERAL VEIN, PERCUTANEOUS APPROACH: ICD-10-PCS | Performed by: INTERNAL MEDICINE
